# Patient Record
Sex: MALE | Race: WHITE | NOT HISPANIC OR LATINO | Employment: FULL TIME | URBAN - METROPOLITAN AREA
[De-identification: names, ages, dates, MRNs, and addresses within clinical notes are randomized per-mention and may not be internally consistent; named-entity substitution may affect disease eponyms.]

---

## 2017-04-07 ENCOUNTER — ALLSCRIPTS OFFICE VISIT (OUTPATIENT)
Dept: OTHER | Facility: OTHER | Age: 42
End: 2017-04-07

## 2017-04-07 DIAGNOSIS — Z13.21 ENCOUNTER FOR SCREENING FOR NUTRITIONAL DISORDER: ICD-10-CM

## 2017-04-07 DIAGNOSIS — M25.552 PAIN IN LEFT HIP: ICD-10-CM

## 2017-04-07 DIAGNOSIS — Z85.47 PERSONAL HISTORY OF MALIGNANT NEOPLASM OF TESTIS: ICD-10-CM

## 2017-04-07 DIAGNOSIS — E78.5 HYPERLIPIDEMIA: ICD-10-CM

## 2017-04-07 DIAGNOSIS — C62.90 MALIGNANT NEOPLASM OF TESTIS (HCC): ICD-10-CM

## 2017-07-06 ENCOUNTER — APPOINTMENT (OUTPATIENT)
Dept: LAB | Facility: CLINIC | Age: 42
End: 2017-07-06
Payer: COMMERCIAL

## 2017-07-06 ENCOUNTER — TRANSCRIBE ORDERS (OUTPATIENT)
Dept: LAB | Facility: CLINIC | Age: 42
End: 2017-07-06

## 2017-07-06 DIAGNOSIS — C62.90 MALIGNANT NEOPLASM OF TESTIS (HCC): ICD-10-CM

## 2017-07-06 DIAGNOSIS — M25.552 PAIN IN LEFT HIP: ICD-10-CM

## 2017-07-06 DIAGNOSIS — E78.5 HYPERLIPIDEMIA: ICD-10-CM

## 2017-07-06 DIAGNOSIS — Z13.21 ENCOUNTER FOR SCREENING FOR NUTRITIONAL DISORDER: ICD-10-CM

## 2017-07-06 LAB
25(OH)D3 SERPL-MCNC: 28.9 NG/ML (ref 30–100)
ALBUMIN SERPL BCP-MCNC: 4.3 G/DL (ref 3.5–5)
ALP SERPL-CCNC: 69 U/L (ref 46–116)
ALT SERPL W P-5'-P-CCNC: 23 U/L (ref 12–78)
ANION GAP SERPL CALCULATED.3IONS-SCNC: 4 MMOL/L (ref 4–13)
AST SERPL W P-5'-P-CCNC: 12 U/L (ref 5–45)
BASOPHILS # BLD AUTO: 0.03 THOUSANDS/ΜL (ref 0–0.1)
BASOPHILS NFR BLD AUTO: 1 % (ref 0–1)
BILIRUB SERPL-MCNC: 0.48 MG/DL (ref 0.2–1)
BILIRUB UR QL STRIP: NEGATIVE
BUN SERPL-MCNC: 12 MG/DL (ref 5–25)
CALCIUM SERPL-MCNC: 8.9 MG/DL (ref 8.3–10.1)
CHLORIDE SERPL-SCNC: 103 MMOL/L (ref 100–108)
CHOLEST SERPL-MCNC: 178 MG/DL (ref 50–200)
CLARITY UR: CLEAR
CO2 SERPL-SCNC: 30 MMOL/L (ref 21–32)
COLOR UR: YELLOW
CREAT SERPL-MCNC: 0.94 MG/DL (ref 0.6–1.3)
EOSINOPHIL # BLD AUTO: 0.74 THOUSAND/ΜL (ref 0–0.61)
EOSINOPHIL NFR BLD AUTO: 14 % (ref 0–6)
ERYTHROCYTE [DISTWIDTH] IN BLOOD BY AUTOMATED COUNT: 12.2 % (ref 11.6–15.1)
GFR SERPL CREATININE-BSD FRML MDRD: >60 ML/MIN/1.73SQ M
GLUCOSE P FAST SERPL-MCNC: 94 MG/DL (ref 65–99)
GLUCOSE UR STRIP-MCNC: NEGATIVE MG/DL
HCG-TM SERPL-SCNC: <2 MLU/ML
HCT VFR BLD AUTO: 44.4 % (ref 36.5–49.3)
HDLC SERPL-MCNC: 61 MG/DL (ref 40–60)
HGB BLD-MCNC: 15.5 G/DL (ref 12–17)
HGB UR QL STRIP.AUTO: NEGATIVE
KETONES UR STRIP-MCNC: NEGATIVE MG/DL
LDLC SERPL CALC-MCNC: 108 MG/DL (ref 0–100)
LEUKOCYTE ESTERASE UR QL STRIP: NEGATIVE
LYMPHOCYTES # BLD AUTO: 1.49 THOUSANDS/ΜL (ref 0.6–4.47)
LYMPHOCYTES NFR BLD AUTO: 28 % (ref 14–44)
MCH RBC QN AUTO: 30.5 PG (ref 26.8–34.3)
MCHC RBC AUTO-ENTMCNC: 34.9 G/DL (ref 31.4–37.4)
MCV RBC AUTO: 87 FL (ref 82–98)
MONOCYTES # BLD AUTO: 0.48 THOUSAND/ΜL (ref 0.17–1.22)
MONOCYTES NFR BLD AUTO: 9 % (ref 4–12)
NEUTROPHILS # BLD AUTO: 2.65 THOUSANDS/ΜL (ref 1.85–7.62)
NEUTS SEG NFR BLD AUTO: 48 % (ref 43–75)
NITRITE UR QL STRIP: NEGATIVE
NRBC BLD AUTO-RTO: 0 /100 WBCS
PH UR STRIP.AUTO: 7 [PH] (ref 4.5–8)
PLATELET # BLD AUTO: 222 THOUSANDS/UL (ref 149–390)
PMV BLD AUTO: 10.3 FL (ref 8.9–12.7)
POTASSIUM SERPL-SCNC: 3.9 MMOL/L (ref 3.5–5.3)
PROT SERPL-MCNC: 7.5 G/DL (ref 6.4–8.2)
PROT UR STRIP-MCNC: NEGATIVE MG/DL
RBC # BLD AUTO: 5.09 MILLION/UL (ref 3.88–5.62)
SODIUM SERPL-SCNC: 137 MMOL/L (ref 136–145)
SP GR UR STRIP.AUTO: 1 (ref 1–1.03)
TRIGL SERPL-MCNC: 45 MG/DL
TSH SERPL DL<=0.05 MIU/L-ACNC: 2.27 UIU/ML (ref 0.36–3.74)
UROBILINOGEN UR QL STRIP.AUTO: 0.2 E.U./DL
WBC # BLD AUTO: 5.4 THOUSAND/UL (ref 4.31–10.16)

## 2017-07-06 PROCEDURE — 84443 ASSAY THYROID STIM HORMONE: CPT

## 2017-07-06 PROCEDURE — 83615 LACTATE (LD) (LDH) ENZYME: CPT

## 2017-07-06 PROCEDURE — 82107 ALPHA-FETOPROTEIN L3: CPT

## 2017-07-06 PROCEDURE — 85025 COMPLETE CBC W/AUTO DIFF WBC: CPT

## 2017-07-06 PROCEDURE — 36415 COLL VENOUS BLD VENIPUNCTURE: CPT

## 2017-07-06 PROCEDURE — 80053 COMPREHEN METABOLIC PANEL: CPT

## 2017-07-06 PROCEDURE — 83625 ASSAY OF LDH ENZYMES: CPT

## 2017-07-06 PROCEDURE — 84702 CHORIONIC GONADOTROPIN TEST: CPT

## 2017-07-06 PROCEDURE — 81003 URINALYSIS AUTO W/O SCOPE: CPT

## 2017-07-06 PROCEDURE — 82306 VITAMIN D 25 HYDROXY: CPT

## 2017-07-06 PROCEDURE — 80061 LIPID PANEL: CPT

## 2017-07-07 LAB
AFP L3 MFR SERPL: NORMAL % (ref 0–9.9)
AFP SERPL-MCNC: 1.3 NG/ML (ref 0–8)

## 2017-07-08 LAB
LDH SERPL-CCNC: 141 IU/L (ref 121–224)
LDH1 CFR SERPL ELPH: 24 % (ref 17–32)
LDH2 CFR SERPL ELPH: 34 % (ref 25–40)
LDH3 CFR SERPL ELPH: 21 % (ref 17–27)
LDH4 CFR SERPL ELPH: 10 % (ref 5–13)
LDH5 CFR SERPL ELPH: 11 % (ref 4–20)

## 2017-07-11 ENCOUNTER — ALLSCRIPTS OFFICE VISIT (OUTPATIENT)
Dept: OTHER | Facility: OTHER | Age: 42
End: 2017-07-11

## 2017-07-11 DIAGNOSIS — E55.9 VITAMIN D DEFICIENCY: ICD-10-CM

## 2017-07-11 DIAGNOSIS — E78.5 HYPERLIPIDEMIA: ICD-10-CM

## 2017-07-11 DIAGNOSIS — M25.552 PAIN IN LEFT HIP: ICD-10-CM

## 2017-07-11 DIAGNOSIS — M76.892 OTHER SPECIFIED ENTHESOPATHIES OF LEFT LOWER LIMB, EXCLUDING FOOT: ICD-10-CM

## 2017-07-11 DIAGNOSIS — C62.92 MALIGNANT NEOPLASM OF LEFT TESTIS (HCC): ICD-10-CM

## 2017-08-25 ENCOUNTER — ALLSCRIPTS OFFICE VISIT (OUTPATIENT)
Dept: OTHER | Facility: OTHER | Age: 42
End: 2017-08-25

## 2018-01-12 VITALS
OXYGEN SATURATION: 98 % | BODY MASS INDEX: 24.18 KG/M2 | WEIGHT: 178.5 LBS | DIASTOLIC BLOOD PRESSURE: 78 MMHG | HEART RATE: 56 BPM | SYSTOLIC BLOOD PRESSURE: 110 MMHG | HEIGHT: 72 IN | TEMPERATURE: 97 F | RESPIRATION RATE: 18 BRPM

## 2018-01-13 VITALS
BODY MASS INDEX: 24.55 KG/M2 | HEIGHT: 72 IN | SYSTOLIC BLOOD PRESSURE: 120 MMHG | HEART RATE: 70 BPM | WEIGHT: 181.25 LBS | OXYGEN SATURATION: 98 % | DIASTOLIC BLOOD PRESSURE: 82 MMHG

## 2018-01-14 VITALS
HEIGHT: 72 IN | OXYGEN SATURATION: 97 % | BODY MASS INDEX: 25.19 KG/M2 | SYSTOLIC BLOOD PRESSURE: 112 MMHG | DIASTOLIC BLOOD PRESSURE: 68 MMHG | WEIGHT: 186 LBS | HEART RATE: 56 BPM

## 2018-02-01 DIAGNOSIS — Z90.79 ACQUIRED ABSENCE OF OTHER GENITAL ORGAN(S): ICD-10-CM

## 2018-02-01 DIAGNOSIS — C62.90 MALIGNANT NEOPLASM OF TESTIS (HCC): ICD-10-CM

## 2018-10-15 ENCOUNTER — TELEPHONE (OUTPATIENT)
Dept: INTERNAL MEDICINE CLINIC | Facility: CLINIC | Age: 43
End: 2018-10-15

## 2018-10-16 NOTE — TELEPHONE ENCOUNTER
Patient called back  Gave him the travel information  He is going to think about it and call us back if he wants to schedule an appointment  He thought we could just call in the immunization to the pharmacy for him

## 2019-07-23 RX ORDER — SIMVASTATIN 10 MG
20 TABLET ORAL
COMMUNITY
End: 2019-08-19 | Stop reason: SDUPTHER

## 2019-07-24 ENCOUNTER — OFFICE VISIT (OUTPATIENT)
Dept: FAMILY MEDICINE CLINIC | Facility: CLINIC | Age: 44
End: 2019-07-24
Payer: COMMERCIAL

## 2019-07-24 VITALS
BODY MASS INDEX: 25.68 KG/M2 | HEIGHT: 72 IN | WEIGHT: 189.6 LBS | SYSTOLIC BLOOD PRESSURE: 126 MMHG | DIASTOLIC BLOOD PRESSURE: 82 MMHG | OXYGEN SATURATION: 97 % | RESPIRATION RATE: 18 BRPM | TEMPERATURE: 97.4 F | HEART RATE: 68 BPM

## 2019-07-24 DIAGNOSIS — E78.5 HYPERLIPIDEMIA, UNSPECIFIED HYPERLIPIDEMIA TYPE: Primary | ICD-10-CM

## 2019-07-24 DIAGNOSIS — Z85.47 HISTORY OF TESTICULAR CANCER: ICD-10-CM

## 2019-07-24 DIAGNOSIS — E55.9 VITAMIN D DEFICIENCY: ICD-10-CM

## 2019-07-24 PROCEDURE — 99214 OFFICE O/P EST MOD 30 MIN: CPT | Performed by: INTERNAL MEDICINE

## 2019-07-24 PROCEDURE — 1036F TOBACCO NON-USER: CPT | Performed by: INTERNAL MEDICINE

## 2019-07-24 PROCEDURE — 3008F BODY MASS INDEX DOCD: CPT | Performed by: INTERNAL MEDICINE

## 2019-07-24 NOTE — PROGRESS NOTES
Assessment/Plan:     Diagnoses and all orders for this visit:    Hyperlipidemia, unspecified hyperlipidemia type  -     CBC and differential  -     Lipid panel  -     TSH, 3rd generation with Free T4 reflex  -     Comprehensive metabolic panel  -Has been on simvastatin for a few years  Will check follow up blood work  History of testicular cancer   -s/p left orchiectomy on February 12, 2013 followed by 2 courses of adjuvant bleomycin, etoposide and cisplatin  Was seen by Dr Romana Reeves in the past and note was reviewed  Blood work was last done about 2 years ago  Vitamin D deficiency  -     Vitamin D 25 hydroxy; Future    Does not take anything on a regular basis  Reminded to complete a dental exam as it has been some time  Subjective:      Patient ID: Alda Miller is a 38 y o  male  Reena Hazel is here today to establish care  Medical chart was reviewed and updated  He reports feeling well overall and is largely without complaints  The following portions of the patient's history were reviewed and updated as appropriate: allergies, current medications, past family history, past medical history, past social history, past surgical history and problem list     Review of Systems   Constitutional: Negative for chills, fever and unexpected weight change  HENT: Negative for sinus pressure, sinus pain and sore throat  Eyes: Negative for visual disturbance  Respiratory: Negative for cough, chest tightness, shortness of breath and wheezing  Cardiovascular: Negative for chest pain, palpitations and leg swelling  Gastrointestinal: Negative for abdominal pain, blood in stool, constipation, diarrhea, nausea and vomiting  Genitourinary: Negative for difficulty urinating and dysuria  Musculoskeletal: Negative for arthralgias, back pain and myalgias  Neurological: Negative for dizziness, syncope, numbness and headaches  Psychiatric/Behavioral: Negative for dysphoric mood and sleep disturbance   The patient is not nervous/anxious  Objective:      /82   Pulse 68   Temp (!) 97 4 °F (36 3 °C)   Resp 18   Ht 6' (1 829 m)   Wt 86 kg (189 lb 9 6 oz)   SpO2 97%   BMI 25 71 kg/m²          Physical Exam   Constitutional: He is oriented to person, place, and time  He appears well-developed and well-nourished  No distress  HENT:   Head: Normocephalic and atraumatic  Right Ear: External ear normal    Left Ear: External ear normal    Mouth/Throat: Oropharynx is clear and moist    Eyes: Conjunctivae and EOM are normal  Right eye exhibits no discharge  Left eye exhibits no discharge  No scleral icterus  Neck: Normal range of motion  No thyromegaly present  Cardiovascular: Normal rate, regular rhythm and normal heart sounds  No murmur heard  Pulmonary/Chest: Effort normal and breath sounds normal  No respiratory distress  He has no wheezes  He exhibits no tenderness  Abdominal: Soft  Bowel sounds are normal  There is no tenderness  There is no rebound  Musculoskeletal: Normal range of motion  He exhibits no edema  Lymphadenopathy:     He has no cervical adenopathy  Neurological: He is alert and oriented to person, place, and time  He has normal reflexes  Skin: Skin is warm and dry  He is not diaphoretic  No erythema  Psychiatric: He has a normal mood and affect  His speech is normal and behavior is normal  Judgment and thought content normal    Vitals reviewed

## 2019-08-19 DIAGNOSIS — E78.5 HYPERLIPIDEMIA, UNSPECIFIED HYPERLIPIDEMIA TYPE: Primary | ICD-10-CM

## 2019-08-19 DIAGNOSIS — E78.5 HYPERLIPIDEMIA, UNSPECIFIED HYPERLIPIDEMIA TYPE: ICD-10-CM

## 2019-08-19 RX ORDER — SIMVASTATIN 20 MG
20 TABLET ORAL
Qty: 90 TABLET | Refills: 3 | Status: SHIPPED | OUTPATIENT
Start: 2019-08-19 | End: 2019-08-19 | Stop reason: SDUPTHER

## 2019-08-19 RX ORDER — SIMVASTATIN 20 MG
20 TABLET ORAL
Qty: 90 TABLET | Refills: 3 | Status: SHIPPED | OUTPATIENT
Start: 2019-08-19

## 2019-08-19 NOTE — TELEPHONE ENCOUNTER
----- Message from Jean Dugan sent at 8/17/2019  2:04 AM EDT -----  Regarding: Non-Urgent Medical Question  Contact: [phone]  I am writing to request a refill of simvastatin  Unable to request via ervin    Instructed to send a non-urgent medical question message to request       Jean Dugan  1975

## 2021-01-27 DIAGNOSIS — Z23 ENCOUNTER FOR IMMUNIZATION: ICD-10-CM

## 2021-02-08 ENCOUNTER — IMMUNIZATIONS (OUTPATIENT)
Dept: FAMILY MEDICINE CLINIC | Facility: HOSPITAL | Age: 46
End: 2021-02-08

## 2021-02-08 DIAGNOSIS — Z23 ENCOUNTER FOR IMMUNIZATION: Primary | ICD-10-CM

## 2021-02-08 PROCEDURE — 91301 SARS-COV-2 / COVID-19 MRNA VACCINE (MODERNA) 100 MCG: CPT

## 2021-02-08 PROCEDURE — 0011A SARS-COV-2 / COVID-19 MRNA VACCINE (MODERNA) 100 MCG: CPT

## 2021-03-10 ENCOUNTER — IMMUNIZATIONS (OUTPATIENT)
Dept: FAMILY MEDICINE CLINIC | Facility: HOSPITAL | Age: 46
End: 2021-03-10

## 2021-03-10 DIAGNOSIS — Z23 ENCOUNTER FOR IMMUNIZATION: Primary | ICD-10-CM

## 2021-03-10 PROCEDURE — 0012A SARS-COV-2 / COVID-19 MRNA VACCINE (MODERNA) 100 MCG: CPT

## 2021-03-10 PROCEDURE — 91301 SARS-COV-2 / COVID-19 MRNA VACCINE (MODERNA) 100 MCG: CPT

## 2022-11-07 NOTE — TELEPHONE ENCOUNTER
PT  LEFT MESSAGE ON NURSE LINE ASKING FOR TYPHOID VACCINE FOR UPCOMING TRAVEL   CAN SOMEONE CALL AND SCHEDULE HIM FOR DR Alcides Lopez none